# Patient Record
Sex: FEMALE | Race: ASIAN
[De-identification: names, ages, dates, MRNs, and addresses within clinical notes are randomized per-mention and may not be internally consistent; named-entity substitution may affect disease eponyms.]

---

## 2017-12-12 ENCOUNTER — HOSPITAL ENCOUNTER (OUTPATIENT)
Dept: HOSPITAL 45 - C.PAPS | Age: 37
Discharge: HOME | End: 2017-12-12
Attending: OBSTETRICS & GYNECOLOGY
Payer: COMMERCIAL

## 2017-12-12 DIAGNOSIS — Z01.419: Primary | ICD-10-CM

## 2018-01-02 ENCOUNTER — HOSPITAL ENCOUNTER (OUTPATIENT)
Dept: HOSPITAL 45 - C.MAMM | Age: 38
Discharge: HOME | End: 2018-01-02
Attending: OBSTETRICS & GYNECOLOGY
Payer: COMMERCIAL

## 2018-01-02 DIAGNOSIS — R92.0: ICD-10-CM

## 2018-01-02 DIAGNOSIS — N63.12: Primary | ICD-10-CM

## 2018-01-03 NOTE — MAMMOGRAPHY REPORT
BILATERAL DIGITAL DIAGNOSTIC MAMMOGRAM TOMOSYNTHESIS WITH CAD AND TARGETED BILATERAL ULTRASOUND: 1/2/
2018

CLINICAL HISTORY: 37-year-old woman presents after her physician felt a linear area of thickening in 
the 3:00 right breast and a 1 cm mass in the right upper inner quadrant.  The patient reports she has
 an abnormal tenderness and possible mass in the lower outer quadrant of the left breast.  Baseline e
xam.  





TECHNIQUE: Bilateral breast tomosynthesis in addition to standard 2D mammography was performed.  Spot
 magnification left CC and ML views were also obtained.  Current study was also evaluated with a Comp
uter Aided Detection (CAD) system.  



COMPARISON: No prior exams were available for comparison.   



BREAST COMPOSITION:  The tissue of both breasts is heterogeneously dense, which may obscure small mas
ses.  



FINDINGS:  There are groupings of microcalcifications in the inferior left breast both medially and l
aterally, for which additional spot magnification views were obtained.  On the spot magnification vie
ws, there are a few somewhat coarse loosely grouped calcifications in the anterior and slightly later
al posterior left breast.  There is a loose grouping of punctate monomorphic microcalcifications in t
he medial, middle one third of the left breast measuring 9 mm, and 2 small loose groupings of punctat
e microcalcifications in the slightly lateral and posterior left breast.  None of the calcifications 
demonstrate definitive layering on the spot magnification ML view to suggest benign milk of calcium.



No other suspicious masses, calcifications, asymmetries or area of architectural distortion are ident
ified bilaterally.



Targeted ultrasound was performed in both breasts, within the areas of concern in the upper inner gina
drant of the right breast identified by the patient's provider, and in the lower outer left breast in
 the area of concern pointed out by the patient.  In the 3:00 right breast, 4 cm from the nipple, the
re is a lobulated, partially circumscribed and partially angular hypoechoic solid appearing mass rod
uring 8.6 x 4.4 x 9.1 mm.  Although this could represent a benign fibroadenoma, definitive characteri
zation with an ultrasound guided core biopsy is recommended.  No other discrete solid or cystic mass 
is seen throughout the remainder of the right upper inner quadrant.



Throughout the left lower outer quadrant in the area of concern pointed out by the patient, sonograph
ically normal tissue is seen without a suspicious solid or cystic mass.



IMPRESSION:  ACR BI-RADS CATEGORY 4: SUSPICIOUS, TARGETED ULTRASOUND ACR BI-RADS CATEGORY 4: SUSPICIO
US 

1.  Ultrasound guided core biopsy is recommended for an indeterminate solid hypoechoic 9.1 mm mass in
 the 3:00 right breast, which likely correlates with the palpable abnormality identified by the patie
nt's provider.

2.  No suspicious mammographic or sonographic abnormality in the lower outer quadrant of the left masoud
ast in the area of concern pointed out by the patient.

3.  Stereotactic guided biopsy is recommended for a dominant loose grouping of punctate metamorphic m
icrocalcifications in the medial left breast.

4. Pending benign pathology results, would continue to follow 2 other small faint loose groupings of 
punctate microcalcifications in the slightly lateral and posterior left breast with repeat spot magni
fication views in 6 months.



These results and recommendations were discussed with the patient at the time of the exam.  She tenta
tively scheduled the bilateral breast biopsies prior to leaving our department.



Approximately 10% of breast cancers are not detected with mammography. A negative mammographic report
 should not delay biopsy if a clinically suggestive mass is present.



Manisha Villegas M.D.          

ay/:1/2/2018 14:10:14  



Imaging Technologist: Nanci KAM)(FRED), [facility]

letter sent: Abnormal 4/5  

BI-RADS Code: ACR BI-RADS Category 4: Suspicious  Ultrasound BI-RADS: ACR BI-RADS Category 4: Suspici
ous

## 2018-01-10 ENCOUNTER — HOSPITAL ENCOUNTER (OUTPATIENT)
Dept: HOSPITAL 45 - C.MAMM | Age: 38
Discharge: HOME | End: 2018-01-10
Attending: OBSTETRICS & GYNECOLOGY
Payer: COMMERCIAL

## 2018-01-10 DIAGNOSIS — D24.1: ICD-10-CM

## 2018-01-10 DIAGNOSIS — N63.10: ICD-10-CM

## 2018-01-10 DIAGNOSIS — R92.0: Primary | ICD-10-CM

## 2018-01-10 NOTE — MAMMOGRAPHY REPORT
UNILATERAL RIGHT DIGITAL DIAGNOSTIC MAMMOGRAM TOMOSYNTHESIS: 1/10/2018

CLINICAL HISTORY: Status post right breast biopsy.  





TECHNIQUE:  Breast tomosynthesis in addition to standard 2D mammography was performed.  Postprocedura
l right CC and ML tomosynthesis images including C views were obtained.



COMPARISON: Comparison is made to exams dated:  1/2/2018 mammogram and 1/2/2018 ultrasound - [facility].   



BREAST COMPOSITION:  The tissue of the right breast is heterogeneously dense, which may obscure small
 masses.  



FINDINGS:  A new ribbon biopsy marker clip is seen at the site of the biopsied mass in the right 3:00
 breast.  No significant postbiopsy hematoma is seen.





IMPRESSION:  POST PROCEDURE IMAGING FOR MARKER PLACEMENT

New biopsy marker clip status post right breast biopsy.  Pathology results are pending.



Approximately 10% of breast cancers are not detected with mammography. A negative mammographic report
 should not delay biopsy if a clinically suggestive mass is present.



Selena Diallo M.D.          

ah/:1/10/2018 11:50:54  



Imaging Technologist: Xiomara DINH(KEN)(M), [facility]



BI-RADS Code: Post Procedure Imaging For Marker Placement

## 2018-01-10 NOTE — DISCHARGE INSTRUCTIONS
Discharge Instructions


Procedure


Procedure Date:


Andrez 10, 2018.


Reason for visit:


Left Calcs/Right Mass--Core Bx.





Discharge


Discharge Date:


Andrez 10, 2018.


Discharge Diagnosis:


status post breast biopsy





Instructions


Activity Recommendations:  Additional Limitations (see below)


Return to School/Work:  no limitations


Recommended Home Diet:  No Limitations


Provider Instructions:





ACTIVITY RECOMMENDATIONS:





*  No lifting, pushing, pulling or exercising the affected side for three days.








RETURN TO SCHOOL/WORK:





*  You may return to work/school after the procedure, but do not perform any 

strenuous


   activities for 24 to 48 hours.








MEDICATIONS:





*  Tylenol (two 325 mg) every four to six hours if needed for mild pain (if not 

allergic to Tylenol).








DIET:





*  Resume previous diet.








SPECIAL CARE INSTRUCTIONS:





*  Keep biopsy site dry for 24 hours.  May shower after 24 hours, but do not 

soak (bathe)


   incision.





*  May remove Tegaderm (plastic patch) tomorrow AFTER showering.





*  Leave the steri-strips on for one week.  Allow the steri-strips to fall off 

by themselves.  


   If not off after one week, you may remove them.  You may place a Bandaid


   crosswise over the strips, if desired.





*  Apply ice 10 minutes on and 10 minutes off as needed.





*  Wear a bra at bedtime to sleep more comfortably for 2-3 days.





*  Your referring physician should have the results after approximately 5 to 7 

business days.





*  Call for unusual bleeding, fever, drainage, etc or if you have any questions 

call


    [phone] during normal business hours or after hours call Dr Diallo, [phone].








FOLLOW UP VISIT:





Follow-up with Referring Physician as scheduled.





Allergies


Coded Allergies:  


     Sulfa Antibiotics (Verified  Allergy, Mild, ITCHING, 12/19/14)





Nancy Bhatt Recommendations:


 


Call your doctor if:





*  Temperature above 101 degrees


*  Pain not relieved by pain medicine ordered


*  There is increased drainage or redness from any incision


*  You have any unanswered questions or concerns.





Your Doctors Instructions noted above were prepared by provider Selena Diallo.


Patient Signature Section:





 Patient Instructions Signature Page














Britney Viveros 











Patient (or Guardian) Signature/Date:____________________________________ I 

have read and understand the instructions given to me by my caregivers.








Caregiver/RN/Doctor Signature/Date:____________________________________











The above-named patient and/or guardian has received patient instructions on 

this date.





























+  Original Patient Signature Page (only) stays with chart.  Please make copy 

for patient.

## 2018-01-10 NOTE — MAMMOGRAPHY REPORT
ULTRASOUND GUIDED BIOPSY RIGHT BREAST: 1/10/2018

CLINICAL HISTORY: Right 3:00 breast mass.  



PATIENT CONSENT: The procedure, risks and benefits were discussed with the patient and informed writt
en consent was obtained. A timeout was performed immediately prior to the procedure.    



PROCEDURE DESCRIPTION: With ultrasound guidance, aseptic technique, and lidocaine as the local anesth
etic (1% lidocaine to anesthetize the skin and 1% lidocaine with epinephrine to anesthetize the deepe
r tissues), the mass of concern in the right 3:00 breast was sampled 4 times with a 14-gauge Achieve 
biopsy needle.  Immediately thereafter, with ultrasound guidance, aseptic technique, and lidocaine as
 the local anesthetic, a metallic localizer clip was placed centrally in the mass.  Direct pressure w
as applied to the site immediately post procedure and hemostasis was achieved.  Postprocedure unilate
ral mammograms were performed to confirm clip placement.  The patient tolerated the procedure without
 complication.  She was given wound care instructions. The specimens were sent to pathology for hernandez
sis. 



At the time of the consent, the patient questioned whether the biopsy of the left calcifications need
ed to be performed.  Review of the diagnostic mammograms dated 1/2/2018 shows a group of punctate sohnda
ign-appearing calcifications in the left medial breast at approximately 9:00 as well as a similar-rani
earing grup in the lateral posterior breast.  Given that this exam is a baseline exam and given that 
the calcifications are round/punctate in morphology, a 6 month follow-up could be performed to assess
 for any changes.  Therefore, the options of short interval follow-up versus stereotactic biopsy were
 discussed with the patient.  At this time, she prefers six-month follow-up which is reasonable given
 that this is a baseline exam.  Therefore, stereotactic biopsy was not performed of left breast calci
fications.

  

COMPARISON: Comparison is made to exams dated:  1/2/2018 mammogram and 1/2/2018 ultrasound - [facility].   





IMPRESSION: ULTRASOUND GUIDED BIOPSY

1.  Ultrasound guided core needle biopsy of the right 3:00 breast mass, with clip placement.  The pat
ient will receive pathology results from her referring provider.

2.  After discussion with the patient, the decision was made to perform short interval follow-up of t
he left breast calcifications as opposed to stereotactic biopsy.  Therefore, stereotactic biopsy was 
not performed today.  Therefore, recommend follow-up diagnostic tomosynthesis mammograms in 6 months 
to confirm stability on spot magnification views.





Selena Diallo M.D.  

ah/:1/10/2018 11:51:24  



Attending Technologist: Selena Diallo MD, [facility]

Imaging Technologist: Xiomara DINH(R)(M), [facility]

## 2018-05-15 ENCOUNTER — HOSPITAL ENCOUNTER (OUTPATIENT)
Dept: HOSPITAL 45 - C.MAMM | Age: 38
Discharge: HOME | End: 2018-05-15
Attending: OBSTETRICS & GYNECOLOGY
Payer: COMMERCIAL

## 2018-05-15 DIAGNOSIS — R92.0: Primary | ICD-10-CM

## 2018-05-15 NOTE — MAMMOGRAPHY REPORT
UNILATERAL LEFT DIGITAL DIAGNOSTIC MAMMOGRAM TOMOSYNTHESIS WITH CAD: 5/15/2018

CLINICAL HISTORY: 38-year-old woman presents for a follow-up in the left breast of loose groupings of
 microcalcifications located both medially and laterally.  She has a history of prior benign ultrasou
nd-guided core biopsy in the right breast at 3:00, which yielded a fibroadenoma.  





TECHNIQUE: Left breast CC and MLO 2D and tomosynthesis images; spot magnification left CC and ML view
s were obtained.  Current study was also evaluated with a Computer Aided Detection (CAD) system.  



COMPARISON: Comparison is made to exams dated:  1/10/2018 mammogram, 1/10/2018 ultrasound biopsy, 1/2
/2018 ultrasound, and 1/2/2018 mammogram - [facility].   



BREAST COMPOSITION:  The tissue of the left breast is heterogeneously dense, which may obscure small 
masses.  



FINDINGS: The glandular pattern of the left breast is similar to the prior mammograms, with stable as
ymmetry in the lateral posterior left breast.  No new suspicious masses, developing asymmetry or foca
l area of architectural distortion is identified.  

The spot magnification views of the left breast redemonstrate loosely grouped punctate microcalcifica
tions in both the medial and lateral middle to posterior left breast.  These are unchanged in number 
and distribution comparing back to the spot magnification views obtained 1/2/2018 and most likely rep
resent benign fibrocystic change. Given that they were identified on a baseline mammogram, another sh
ort interval follow-up left diagnostic mammogram including spot magnification views is recommended to
 ensure longer stability in 6 months.



IMPRESSION:  ACR-BI-RADS CATEGORY 3: PROBABLY BENIGN

Stable mammographic appearance of the left breast including loose groupings of punctate microcalcific
ations in the medial and lateral middle to posterior breast.  Another six-month follow-up left diagno
stic mammogram including spot magnification views is recommended to ensure longer stability.  Annual 
right mammography will also be due at that time.



These results and recommendations were discussed with the patient at the time of the exam.



Approximately 10% of breast cancers are not detected with mammography. A negative mammographic report
 should not delay biopsy if a clinically suggestive mass is present.



Manisha Villegas M.D.          

ay/:5/15/2018 10:48:29  



Imaging Technologist: Xiomara Davis, [facility]

letter sent: Follow Up Recommended 3  

BI-RADS Code: ACR-BI-RADS Category 3: Probably Benign

## 2018-05-18 ENCOUNTER — HOSPITAL ENCOUNTER (OUTPATIENT)
Dept: HOSPITAL 45 - C.LAB1850 | Age: 38
Discharge: HOME | End: 2018-05-18
Attending: INTERNAL MEDICINE
Payer: COMMERCIAL

## 2018-05-18 DIAGNOSIS — E06.3: Primary | ICD-10-CM

## 2018-05-22 ENCOUNTER — HOSPITAL ENCOUNTER (OUTPATIENT)
Dept: HOSPITAL 45 - C.LABSPEC | Age: 38
Discharge: HOME | End: 2018-05-22
Attending: OBSTETRICS & GYNECOLOGY
Payer: COMMERCIAL

## 2018-05-22 DIAGNOSIS — N76.6: Primary | ICD-10-CM
